# Patient Record
Sex: FEMALE | Race: BLACK OR AFRICAN AMERICAN | Employment: FULL TIME | URBAN - METROPOLITAN AREA
[De-identification: names, ages, dates, MRNs, and addresses within clinical notes are randomized per-mention and may not be internally consistent; named-entity substitution may affect disease eponyms.]

---

## 2017-03-10 PROBLEM — I10 ESSENTIAL HYPERTENSION: Status: ACTIVE | Noted: 2017-03-10

## 2017-05-12 ENCOUNTER — ANESTHESIA EVENT (OUTPATIENT)
Dept: ENDOSCOPY | Age: 57
End: 2017-05-12
Payer: COMMERCIAL

## 2017-05-12 ENCOUNTER — HOSPITAL ENCOUNTER (OUTPATIENT)
Age: 57
Setting detail: OUTPATIENT SURGERY
Discharge: HOME | End: 2017-05-12
Attending: SURGERY | Admitting: SURGERY
Payer: COMMERCIAL

## 2017-05-12 ENCOUNTER — ANESTHESIA (OUTPATIENT)
Dept: ENDOSCOPY | Age: 57
End: 2017-05-12
Payer: COMMERCIAL

## 2017-05-12 VITALS
HEIGHT: 63 IN | OXYGEN SATURATION: 99 % | DIASTOLIC BLOOD PRESSURE: 68 MMHG | TEMPERATURE: 98.6 F | HEART RATE: 84 BPM | SYSTOLIC BLOOD PRESSURE: 144 MMHG | BODY MASS INDEX: 36.33 KG/M2 | WEIGHT: 205.03 LBS | RESPIRATION RATE: 16 BRPM

## 2017-05-12 PROCEDURE — 74011000250 HC RX REV CODE- 250

## 2017-05-12 PROCEDURE — 76040000025: Performed by: SURGERY

## 2017-05-12 PROCEDURE — 74011250636 HC RX REV CODE- 250/636: Performed by: SURGERY

## 2017-05-12 PROCEDURE — 74011250636 HC RX REV CODE- 250/636

## 2017-05-12 PROCEDURE — 76060000032 HC ANESTHESIA 0.5 TO 1 HR: Performed by: SURGERY

## 2017-05-12 RX ORDER — SODIUM CHLORIDE, SODIUM LACTATE, POTASSIUM CHLORIDE, CALCIUM CHLORIDE 600; 310; 30; 20 MG/100ML; MG/100ML; MG/100ML; MG/100ML
1000 INJECTION, SOLUTION INTRAVENOUS CONTINUOUS
Status: DISCONTINUED | OUTPATIENT
Start: 2017-05-12 | End: 2017-05-12 | Stop reason: HOSPADM

## 2017-05-12 RX ORDER — PROPOFOL 10 MG/ML
INJECTION, EMULSION INTRAVENOUS
Status: DISCONTINUED | OUTPATIENT
Start: 2017-05-12 | End: 2017-05-12 | Stop reason: HOSPADM

## 2017-05-12 RX ORDER — LIDOCAINE HYDROCHLORIDE 20 MG/ML
INJECTION, SOLUTION EPIDURAL; INFILTRATION; INTRACAUDAL; PERINEURAL AS NEEDED
Status: DISCONTINUED | OUTPATIENT
Start: 2017-05-12 | End: 2017-05-12 | Stop reason: HOSPADM

## 2017-05-12 RX ORDER — PROPOFOL 10 MG/ML
INJECTION, EMULSION INTRAVENOUS AS NEEDED
Status: DISCONTINUED | OUTPATIENT
Start: 2017-05-12 | End: 2017-05-12 | Stop reason: HOSPADM

## 2017-05-12 RX ADMIN — PROPOFOL 20 MG: 10 INJECTION, EMULSION INTRAVENOUS at 09:42

## 2017-05-12 RX ADMIN — PROPOFOL 20 MG: 10 INJECTION, EMULSION INTRAVENOUS at 09:37

## 2017-05-12 RX ADMIN — PROPOFOL 30 MG: 10 INJECTION, EMULSION INTRAVENOUS at 09:40

## 2017-05-12 RX ADMIN — PROPOFOL 160 MCG/KG/MIN: 10 INJECTION, EMULSION INTRAVENOUS at 09:36

## 2017-05-12 RX ADMIN — PROPOFOL 80 MG: 10 INJECTION, EMULSION INTRAVENOUS at 09:36

## 2017-05-12 RX ADMIN — SODIUM CHLORIDE, SODIUM LACTATE, POTASSIUM CHLORIDE, AND CALCIUM CHLORIDE 1000 ML: 600; 310; 30; 20 INJECTION, SOLUTION INTRAVENOUS at 08:36

## 2017-05-12 RX ADMIN — LIDOCAINE HYDROCHLORIDE 40 MG: 20 INJECTION, SOLUTION EPIDURAL; INFILTRATION; INTRACAUDAL; PERINEURAL at 09:36

## 2017-05-12 NOTE — ANESTHESIA POSTPROCEDURE EVALUATION
Post-Anesthesia Evaluation and Assessment    Patient: Huyen Massey MRN: [id number]  SSN: [id number]    YOB: 1960  Age: 64 y.o. Sex: female       Cardiovascular Function/Vital Signs  Visit Vitals    /70    Pulse 88    Temp 37 °C (98.6 °F)    Resp 16    Ht 5' 3\" (1.6 m)    Wt 93 kg (205 lb 0.4 oz)    SpO2 100%    BMI 36.32 kg/m2       Patient is status post total IV anesthesia anesthesia for Procedure(s):  COLONOSCOPY/ BMI=37. Nausea/Vomiting: None    Postoperative hydration reviewed and adequate. Pain:  Pain Scale 1: Numeric (0 - 10) (05/12/17 0820)  Pain Intensity 1: 0 (05/12/17 0820)   Managed    Neurological Status: At baseline    Mental Status and Level of Consciousness: Awake. Pulmonary Status:   O2 Device: Nasal cannula (05/12/17 0957)   Adequate oxygenation and airway patent    Complications related to anesthesia: None    Post-anesthesia assessment completed.  No concerns    Signed By: Felicia Lazo MD     May 12, 2017

## 2017-05-12 NOTE — PROGRESS NOTES
Pt to dc via wc by [employer]. Pt stable at time of dc. Son driving. Pt and son received dc instructions, verbalized understanding.  Pt and son agreed to correct dc instructions given

## 2017-05-12 NOTE — INTERVAL H&P NOTE
H&P Update:  Bob Lubin was seen and examined. History and physical has been reviewed. The patient has been examined.  There have been no significant clinical changes since the completion of the originally dated History and Physical.    Signed By: Yudy Holland MD     May 12, 2017 6:59 AM

## 2017-05-12 NOTE — ANESTHESIA PREPROCEDURE EVALUATION
Anesthetic History   No history of anesthetic complications            Review of Systems / Medical History  Pertinent labs reviewed    Pulmonary  Within defined limits                 Neuro/Psych   Within defined limits           Cardiovascular    Hypertension              Exercise tolerance: >4 METS     GI/Hepatic/Renal  Within defined limits              Endo/Other        Obesity     Other Findings            Physical Exam    Airway  Mallampati: II  TM Distance: 4 - 6 cm  Neck ROM: normal range of motion   Mouth opening: Normal     Cardiovascular  Regular rate and rhythm,  S1 and S2 normal,  no murmur, click, rub, or gallop             Dental    Dentition: Full upper dentures     Pulmonary  Breath sounds clear to auscultation               Abdominal  GI exam deferred       Other Findings            Anesthetic Plan    ASA: 2  Anesthesia type: total IV anesthesia          Induction: Intravenous  Anesthetic plan and risks discussed with: Patient

## 2017-05-12 NOTE — H&P
Progress Notes  Encounter Date: 17  Lynnette North MD   General Surgery      []Hide copied text  []Hover for attribution information           Name: Autumn He    MRN: [id number]   : 1960    Age: 64 y.o. Sex: female  [address] [facility],          CC:        Chief Complaint   Patient presents with    New Patient       colonoscopy          HPI: The patient is referred here for a colonoscopy by Omaira Cardona NP. The patient has never had a colonoscopy. No report of abdominal pain, nausea or vomiting.       Family hx of colon cancer NO       Previous colonoscopy NO   BRBPR NO   Melena NO   Loss of appetite NO   Weight loss NO       Change in bowel habits NO         HISTORY:          Past Medical History   Diagnosis Date    Contact dermatitis and other eczema, due to unspecified cause      Herpes genitalis in women      Hypercholesterolemia      Hypertension        No past surgical history on file. Prior to Admission medications    Medication Sig Start Date End Date Taking? Authorizing Provider   valsartan (DIOVAN) 160 mg tablet Take 1 Tab by mouth daily. 17   Yes Wilbur Freeman NP   propranolol (INDERAL) 10 mg tablet Take by mouth three (3) times daily.     Yes Historical Provider   ibuprofen (MOTRIN) 800 mg tablet Take 1 Tab by mouth every eight (8) hours as needed for Pain. 17   Yes Wilbur Freeman NP   acetaminophen-codeine (TYLENOL #2) 300-15 mg per tablet Take 1 Tab by mouth every six (6) hours as needed for Pain. Max Daily Amount: 4 Tabs. 17   Yes Wilbur Freeman NP   cyclobenzaprine (FLEXERIL) 10 mg tablet Take 1 Tab by mouth nightly.  17   Yes Wilbur Freeman NP           Social History   Substance Use Topics    Smoking status: Never Smoker    Smokeless tobacco: Never Used    Alcohol use No            Family History   Problem Relation Age of Onset    Heart Disease Father      Heart Disease Sister      Diabetes Mother      Diabetes Maternal Grandmother        .       Allergies   Allergen Reactions    Lisinopril Cough    Norvasc [Amlodipine] Itching         Physical Exam:           Visit Vitals    BP (!) 192/116    Pulse 76    Ht 5' 2\" (1.575 m)    Wt 203 lb (92.1 kg)    BMI 37.13 kg/m2         General: Alert, oriented, cooperative black female in no acute distress.         Resp: Breathing is non-labored. Lungs clear to auscultation without wheezing or rhonchi   CV: RRR. No murmurs, rubs or gallops appreciated. Abd: soft non-tender and non-distended without peritoneal signs. +bs   Extremities: No clubbing, cyanosis or edema. Strength intact.        Assessment/Plan: Kian Hernandez is a 64 y.o. female who is here for a colonoscopy     1. Off ASA for one week, if on aspirin     2. Bowel prep     3. Colonoscopy with MAC.  I went through the risks of bleeding, perforation of the colon and cardiopulmonary problems that can develop with the use of anesthesia.     Yunier Larry MD     FACS 5/12/17                Electronically signed by Yunier Larry MD at 5/12/17 5/12/17             Revision History              Detailed Report             Note shared with patient   Note Details   Author Yunier Larry MD File Time 5/12/17   Author Type Physician Status Signed   Last  Yunier Larry MD Specialty General Surgery

## 2017-05-12 NOTE — PROCEDURES
[facility] 65   PROCEDURE NOTE       Name:  Levi Aldrich   MR#:  [id number]   :  1960   Account #:  [de-identified]   Date of Adm:  2017       PREOPERATIVE DIAGNOSIS: Request for screening colonoscopy. POSTOPERATIVE DIAGNOSES   1. Fair preparation. 2. Internal hemorrhoids. 3. No masses or polyps noted. PROCEDURE: Colonoscopy. SURGEON: Kanika Huerta MD    ANESTHESIA: Monitored anesthesia care with Dr. Gaurang Caraballo and Shad Aquino. SPECIMENS: None. ESTIMATED BLOOD LOSS: None. HISTORY: This is a 60-year-old female referred by Ilene Bruce at [facility] for a colonoscopy. The patient has   no family history of colon cancer, never had a colonoscopy   before. She denied bright red blood per rectum, melena, loss of   appetite, weight loss, or change in bowel habits. She had a   bowel prep on 2017, and came in for the procedure. Today,   I saw the patient in the preoperative area. We took her back to   room #4 at [facility]. DESCRIPTION OF PROCEDURE: She was placed on the stretcher in the   left lateral decubitus position. I did a time-out identifying   the patient, surgeon, procedure, and her birth date of   1960. Once the time-out had been agreed upon by everyone   in the room, monitored anesthesia care was done by Dr. Gaurang Caraballo   and Shad Gomez CRNA. A CF-H180AL scope was advanced through the   anus and all the way to the cecum. The cecum was identified with   the ileocecal valve, cecal folds, external compression of right   lower quadrant corresponded to an indentation seen with the   scope. The scope was slowly withdrawn over a 6-minute period of   time. There were no masses, polyps, mucosal abnormalities noted   in the cecum, ascending colon, transverse colon, descending   colon, sigmoid colon. The scope was brought back to the rectum. It was retroflexed. She had some grade 1 internal hemorrhoids. No evidence of bleeding. The scope was withdrawn. Digital rectal   exam revealed good sphincter tone. There were masses palpated. FINDINGS ON COLONOSCOPY   1. Fair prep. 2. Internal hemorrhoids. 3. No masses or polyps. PLAN: I recommend a repeat in 10 years or sooner if symptoms   develop.         Raven Blunt MD      [facility] /    D:  05/12/2017   10:02   T:  05/12/2017   10:35   Job #:  [id number]

## 2017-05-12 NOTE — DISCHARGE INSTRUCTIONS
Gastrointestinal Colonoscopy/Flexible Sigmoidoscopy - Lower Exam Discharge Instructions  1. Call Dr. Gregory Spurling at  for any problems or questions. 2. Contact the doctors office for follow up appointment as directed  3. Medication may cause drowsiness for several hours, therefore, do not drive or operate machinery for remainder of the day. 4. No alcohol today. 5. Ordinarily, you may resume regular diet and activity after exam unless otherwise specified by your physician. 6. Because of air put into your colon during exam, you may experience some abdominal distension, relieved by the passage of gas, for several hours. 7. Contact your physician if you have any of the following:  a. Excessive amount of bleeding - large amount when having a bowel movement. Occasional specks of blood with bowel movement would not be unusual.  b. Severe abdominal pain  c. Fever or Chills  Any additional instructions:  Repeat in 10 years      Instructions given to Jemma Blanca and other family members.   Instructions given by:

## 2017-11-13 ENCOUNTER — TELEPHONE (OUTPATIENT)
Dept: NUTRITION | Age: 57
End: 2017-11-13

## 2017-11-13 NOTE — TELEPHONE ENCOUNTER
Nutrition Counseling:  Pt referred by Daphney Rosen. Spoke with pt who states that she is trying to get \"everything else done with my health. \" Notes she has several appts. States she will call my number back if she thinks she has time free up for an appt. Advised to call if interested or otherwise to follow-up with referring practitioner.     Marlyn Mai MS, 66 [address], [address], [place]  W: [phone]  C: [phone]

## 2017-11-15 ENCOUNTER — HOSPITAL ENCOUNTER (OUTPATIENT)
Dept: ULTRASOUND IMAGING | Age: 57
Discharge: HOME | End: 2017-11-15
Attending: NURSE PRACTITIONER
Payer: COMMERCIAL

## 2017-11-15 DIAGNOSIS — R22.1 THROAT SWELLING: ICD-10-CM

## 2017-11-15 PROCEDURE — 76536 US EXAM OF HEAD AND NECK: CPT

## 2017-11-15 NOTE — PROGRESS NOTES
Ultrasound did not find anything suspicious. Could further evaluate with CT of neck if further issues.

## 2018-01-03 ENCOUNTER — DOCUMENTATION ONLY (OUTPATIENT)
Dept: NUTRITION | Age: 58
End: 2018-01-03

## 2018-01-03 NOTE — PROGRESS NOTES
Nutrition Counseling:  Pt referred by Arielle Irby NP. Pt has not called to schedule. Will close referral on this end.   [facility], [address], [address],[facility], [address] [place] [address]  [phone]

## 2018-01-23 PROBLEM — E66.01 OBESITY, MORBID (HCC): Status: ACTIVE | Noted: 2018-01-23

## 2018-08-17 ENCOUNTER — HOSPITAL ENCOUNTER (OUTPATIENT)
Dept: GENERAL RADIOLOGY | Age: 58
Discharge: HOME | End: 2018-08-17
Attending: INTERNAL MEDICINE
Payer: SELF-PAY

## 2018-08-17 DIAGNOSIS — R07.89 OTHER CHEST PAIN: ICD-10-CM

## 2018-08-17 PROCEDURE — 71046 X-RAY EXAM CHEST 2 VIEWS: CPT

## 2018-08-23 ENCOUNTER — HOSPITAL ENCOUNTER (OUTPATIENT)
Dept: MAMMOGRAPHY | Age: 58
Discharge: HOME | End: 2018-08-23
Attending: NURSE PRACTITIONER
Payer: SELF-PAY

## 2018-08-23 DIAGNOSIS — N64.4 BREAST PAIN, LEFT: ICD-10-CM

## 2018-08-23 PROCEDURE — 77066 DX MAMMO INCL CAD BI: CPT

## 2019-01-25 PROBLEM — A60.04 HERPES SIMPLEX VULVOVAGINITIS: Status: ACTIVE | Noted: 2019-01-25

## 2020-06-21 ENCOUNTER — HOSPITAL ENCOUNTER (OUTPATIENT)
Dept: MAMMOGRAPHY | Age: 60
Discharge: HOME | End: 2020-06-21
Attending: NURSE PRACTITIONER
Payer: COMMERCIAL

## 2020-06-21 DIAGNOSIS — Z12.39 SCREENING FOR MALIGNANT NEOPLASM OF BREAST: ICD-10-CM

## 2020-06-21 PROCEDURE — 77067 SCR MAMMO BI INCL CAD: CPT

## 2021-05-26 ENCOUNTER — TRANSCRIBE ORDER (OUTPATIENT)
Dept: SCHEDULING | Age: 61
End: 2021-05-26

## 2021-05-26 DIAGNOSIS — Z12.31 SCREENING MAMMOGRAM FOR HIGH-RISK PATIENT: Primary | ICD-10-CM

## 2021-06-12 ENCOUNTER — HOSPITAL ENCOUNTER (OUTPATIENT)
Dept: MAMMOGRAPHY | Age: 61
Discharge: HOME | End: 2021-06-12
Attending: NURSE PRACTITIONER
Payer: COMMERCIAL

## 2021-06-12 DIAGNOSIS — Z12.31 SCREENING MAMMOGRAM FOR HIGH-RISK PATIENT: ICD-10-CM

## 2021-06-12 PROCEDURE — 77067 SCR MAMMO BI INCL CAD: CPT

## 2022-03-19 PROBLEM — R07.89 OTHER CHEST PAIN: Status: ACTIVE | Noted: 2018-08-17

## 2022-03-19 PROBLEM — I10 ESSENTIAL HYPERTENSION: Status: ACTIVE | Noted: 2017-03-10

## 2022-03-19 PROBLEM — A60.04 HERPES SIMPLEX VULVOVAGINITIS: Status: ACTIVE | Noted: 2019-01-25

## 2022-03-19 PROBLEM — E66.01 OBESITY, MORBID (HCC): Status: ACTIVE | Noted: 2018-01-23

## (undated) DEVICE — KENDALL RADIOLUCENT FOAM MONITORING ELECTRODE RECTANGULAR SHAPE: Brand: KENDALL

## (undated) DEVICE — CANNULA NSL ORAL AD FOR CAPNOFLEX CO2 O2 AIRLFE

## (undated) DEVICE — CONNECTOR TBNG OD5-7MM O2 END DISP

## (undated) DEVICE — NDL PRT INJ NSAF BLNT 18GX1.5 --